# Patient Record
Sex: MALE | Race: WHITE | Employment: FULL TIME | URBAN - METROPOLITAN AREA
[De-identification: names, ages, dates, MRNs, and addresses within clinical notes are randomized per-mention and may not be internally consistent; named-entity substitution may affect disease eponyms.]

---

## 2017-12-11 ENCOUNTER — ANESTHESIA EVENT (OUTPATIENT)
Dept: SURGERY | Age: 42
End: 2017-12-11
Payer: COMMERCIAL

## 2017-12-12 ENCOUNTER — ANESTHESIA (OUTPATIENT)
Dept: SURGERY | Age: 42
End: 2017-12-12
Payer: COMMERCIAL

## 2017-12-12 ENCOUNTER — HOSPITAL ENCOUNTER (OUTPATIENT)
Age: 42
Setting detail: OUTPATIENT SURGERY
Discharge: HOME | End: 2017-12-12
Attending: SURGERY | Admitting: SURGERY
Payer: COMMERCIAL

## 2017-12-12 VITALS
RESPIRATION RATE: 20 BRPM | DIASTOLIC BLOOD PRESSURE: 82 MMHG | TEMPERATURE: 98.4 F | WEIGHT: 261.02 LBS | BODY MASS INDEX: 36.54 KG/M2 | OXYGEN SATURATION: 94 % | HEART RATE: 84 BPM | HEIGHT: 71 IN | SYSTOLIC BLOOD PRESSURE: 131 MMHG

## 2017-12-12 PROCEDURE — 77030033188 HC TBNG FLTRD BIIFUR DISP CNMD -C: Performed by: SURGERY

## 2017-12-12 PROCEDURE — 77030008771 HC TU NG SALEM SUMP -A: Performed by: ANESTHESIOLOGY

## 2017-12-12 PROCEDURE — 77030010507 HC ADH SKN DERMBND J&J -B: Performed by: SURGERY

## 2017-12-12 PROCEDURE — 77030002966 HC SUT PDS J&J -A: Performed by: SURGERY

## 2017-12-12 PROCEDURE — 77030026438 HC STYL ET INTUB CARD -A: Performed by: ANESTHESIOLOGY

## 2017-12-12 PROCEDURE — 77030011640 HC PAD GRND REM COVD -A: Performed by: SURGERY

## 2017-12-12 PROCEDURE — 77030010939 HC CLP LIG TELE -B: Performed by: SURGERY

## 2017-12-12 PROCEDURE — 77030018673: Performed by: SURGERY

## 2017-12-12 PROCEDURE — 77030035048 HC TRCR ENDOSC OPTCL COVD -B: Performed by: SURGERY

## 2017-12-12 PROCEDURE — 77030035045 HC TRCR ENDOSC VRSPRT BLDLSS COVD -B: Performed by: SURGERY

## 2017-12-12 PROCEDURE — 74011000250 HC RX REV CODE- 250: Performed by: SURGERY

## 2017-12-12 PROCEDURE — 77030002933 HC SUT MCRYL J&J -A: Performed by: SURGERY

## 2017-12-12 PROCEDURE — 76010000876 HC OR TIME 2 TO 2.5HR INTENSV - TIER 2: Performed by: SURGERY

## 2017-12-12 PROCEDURE — 76060000035 HC ANESTHESIA 2 TO 2.5 HR: Performed by: SURGERY

## 2017-12-12 PROCEDURE — 77030003666 HC NDL SPINAL BD -A: Performed by: SURGERY

## 2017-12-12 PROCEDURE — 77030031139 HC SUT VCRL2 J&J -A: Performed by: SURGERY

## 2017-12-12 PROCEDURE — 77030008756 HC TU IRR SUC STRY -B: Performed by: SURGERY

## 2017-12-12 PROCEDURE — 77030037892: Performed by: SURGERY

## 2017-12-12 PROCEDURE — 74011250636 HC RX REV CODE- 250/636

## 2017-12-12 PROCEDURE — 77030035277 HC OBTRTR BLDELSS DISP INTU -B: Performed by: SURGERY

## 2017-12-12 PROCEDURE — 74011250636 HC RX REV CODE- 250/636: Performed by: SURGERY

## 2017-12-12 PROCEDURE — 77030016151 HC PROTCTR LNS DFOG COVD -B: Performed by: SURGERY

## 2017-12-12 PROCEDURE — 77030020782 HC GWN BAIR PAWS FLX 3M -B

## 2017-12-12 PROCEDURE — 77030018836 HC SOL IRR NACL ICUM -A: Performed by: SURGERY

## 2017-12-12 PROCEDURE — 74011250637 HC RX REV CODE- 250/637: Performed by: SURGERY

## 2017-12-12 PROCEDURE — 77030036554: Performed by: SURGERY

## 2017-12-12 PROCEDURE — 77030019908 HC STETH ESOPH SIMS -A: Performed by: ANESTHESIOLOGY

## 2017-12-12 PROCEDURE — 76210000021 HC REC RM PH II 0.5 TO 1 HR: Performed by: SURGERY

## 2017-12-12 PROCEDURE — 77030003580 HC NDL INSUF VERES J&J -B: Performed by: SURGERY

## 2017-12-12 PROCEDURE — 77030020703 HC SEAL CANN DISP INTU -B: Performed by: SURGERY

## 2017-12-12 PROCEDURE — 74011250636 HC RX REV CODE- 250/636: Performed by: ANESTHESIOLOGY

## 2017-12-12 PROCEDURE — 88304 TISSUE EXAM BY PATHOLOGIST: CPT | Performed by: SURGERY

## 2017-12-12 PROCEDURE — 77030035489 HC REDUCR CANN ENDOWR INTU -C: Performed by: SURGERY

## 2017-12-12 PROCEDURE — 77030002895 HC DEV VASC CLOSR COVD -B: Performed by: SURGERY

## 2017-12-12 PROCEDURE — 77030035278 HC STPLR SEAL ENDOWR INTU -B: Performed by: SURGERY

## 2017-12-12 PROCEDURE — 77030035029 HC NDL INSUF VERES DISP COVD -B: Performed by: SURGERY

## 2017-12-12 PROCEDURE — 77030003028 HC SUT VCRL J&J -A: Performed by: SURGERY

## 2017-12-12 PROCEDURE — C1781 MESH (IMPLANTABLE): HCPCS | Performed by: SURGERY

## 2017-12-12 PROCEDURE — 74011000250 HC RX REV CODE- 250

## 2017-12-12 PROCEDURE — 76210000017 HC OR PH I REC 1.5 TO 2 HR: Performed by: SURGERY

## 2017-12-12 PROCEDURE — C9290 INJ, BUPIVACAINE LIPOSOME: HCPCS | Performed by: SURGERY

## 2017-12-12 PROCEDURE — 77030009852 HC PCH RTVR ENDOSC COVD -B: Performed by: SURGERY

## 2017-12-12 PROCEDURE — 77030027491 HC SHR ENDOSC COVD -B: Performed by: SURGERY

## 2017-12-12 PROCEDURE — 77030035236 HC SUT PDS STRATFX BARB J&J -B: Performed by: SURGERY

## 2017-12-12 PROCEDURE — 77030034849: Performed by: SURGERY

## 2017-12-12 PROCEDURE — 77030032490 HC SLV COMPR SCD KNE COVD -B: Performed by: SURGERY

## 2017-12-12 PROCEDURE — 77030013079 HC BLNKT BAIR HGGR 3M -A: Performed by: ANESTHESIOLOGY

## 2017-12-12 PROCEDURE — 77030008684 HC TU ET CUF COVD -B: Performed by: ANESTHESIOLOGY

## 2017-12-12 PROCEDURE — 77030020263 HC SOL INJ SOD CL0.9% LFCR 1000ML: Performed by: SURGERY

## 2017-12-12 PROCEDURE — 77030009848 HC PASSR SUT SET COOP -C: Performed by: SURGERY

## 2017-12-12 DEVICE — VENTRALIGHT ST MESH, 4" X 6" (10.2 CM X 15.2 CM), ELLIPSE
Type: IMPLANTABLE DEVICE | Site: ABDOMEN | Status: FUNCTIONAL
Brand: VENTRALIGHT

## 2017-12-12 RX ORDER — SUCCINYLCHOLINE CHLORIDE 20 MG/ML
INJECTION INTRAMUSCULAR; INTRAVENOUS AS NEEDED
Status: DISCONTINUED | OUTPATIENT
Start: 2017-12-12 | End: 2017-12-12 | Stop reason: HOSPADM

## 2017-12-12 RX ORDER — LIDOCAINE HYDROCHLORIDE 20 MG/ML
INJECTION, SOLUTION EPIDURAL; INFILTRATION; INTRACAUDAL; PERINEURAL AS NEEDED
Status: DISCONTINUED | OUTPATIENT
Start: 2017-12-12 | End: 2017-12-12 | Stop reason: HOSPADM

## 2017-12-12 RX ORDER — FEXOFENADINE HCL AND PSEUDOEPHEDRINE HCI 180; 240 MG/1; MG/1
1 TABLET, EXTENDED RELEASE ORAL DAILY
COMMUNITY

## 2017-12-12 RX ORDER — FLUMAZENIL 0.1 MG/ML
0.2 INJECTION INTRAVENOUS
Status: DISCONTINUED | OUTPATIENT
Start: 2017-12-12 | End: 2017-12-12 | Stop reason: HOSPADM

## 2017-12-12 RX ORDER — LABETALOL HYDROCHLORIDE 5 MG/ML
INJECTION, SOLUTION INTRAVENOUS AS NEEDED
Status: DISCONTINUED | OUTPATIENT
Start: 2017-12-12 | End: 2017-12-12 | Stop reason: HOSPADM

## 2017-12-12 RX ORDER — KETOROLAC TROMETHAMINE 30 MG/ML
INJECTION, SOLUTION INTRAMUSCULAR; INTRAVENOUS AS NEEDED
Status: DISCONTINUED | OUTPATIENT
Start: 2017-12-12 | End: 2017-12-12 | Stop reason: HOSPADM

## 2017-12-12 RX ORDER — HYDROCODONE BITARTRATE AND ACETAMINOPHEN 5; 325 MG/1; MG/1
1 TABLET ORAL
Qty: 15 TAB | Refills: 0 | Status: SHIPPED | OUTPATIENT
Start: 2017-12-12

## 2017-12-12 RX ORDER — MIDAZOLAM HYDROCHLORIDE 1 MG/ML
2 INJECTION, SOLUTION INTRAMUSCULAR; INTRAVENOUS
Status: DISCONTINUED | OUTPATIENT
Start: 2017-12-12 | End: 2017-12-12 | Stop reason: HOSPADM

## 2017-12-12 RX ORDER — FENTANYL CITRATE 50 UG/ML
INJECTION, SOLUTION INTRAMUSCULAR; INTRAVENOUS AS NEEDED
Status: DISCONTINUED | OUTPATIENT
Start: 2017-12-12 | End: 2017-12-12 | Stop reason: HOSPADM

## 2017-12-12 RX ORDER — PROPOFOL 10 MG/ML
INJECTION, EMULSION INTRAVENOUS AS NEEDED
Status: DISCONTINUED | OUTPATIENT
Start: 2017-12-12 | End: 2017-12-12 | Stop reason: HOSPADM

## 2017-12-12 RX ORDER — PANTOPRAZOLE SODIUM 20 MG/1
20 TABLET, DELAYED RELEASE ORAL DAILY
COMMUNITY

## 2017-12-12 RX ORDER — HYDROCODONE BITARTRATE AND ACETAMINOPHEN 5; 325 MG/1; MG/1
1 TABLET ORAL
Status: COMPLETED | OUTPATIENT
Start: 2017-12-12 | End: 2017-12-12

## 2017-12-12 RX ORDER — MIDAZOLAM HYDROCHLORIDE 1 MG/ML
INJECTION, SOLUTION INTRAMUSCULAR; INTRAVENOUS AS NEEDED
Status: DISCONTINUED | OUTPATIENT
Start: 2017-12-12 | End: 2017-12-12 | Stop reason: HOSPADM

## 2017-12-12 RX ORDER — NEOSTIGMINE METHYLSULFATE 1 MG/ML
INJECTION INTRAVENOUS AS NEEDED
Status: DISCONTINUED | OUTPATIENT
Start: 2017-12-12 | End: 2017-12-12 | Stop reason: HOSPADM

## 2017-12-12 RX ORDER — SODIUM CHLORIDE, SODIUM LACTATE, POTASSIUM CHLORIDE, CALCIUM CHLORIDE 600; 310; 30; 20 MG/100ML; MG/100ML; MG/100ML; MG/100ML
125 INJECTION, SOLUTION INTRAVENOUS CONTINUOUS
Status: DISCONTINUED | OUTPATIENT
Start: 2017-12-12 | End: 2017-12-12 | Stop reason: HOSPADM

## 2017-12-12 RX ORDER — CEFAZOLIN SODIUM IN 0.9 % NACL 2 G/50 ML
2 INTRAVENOUS SOLUTION, PIGGYBACK (ML) INTRAVENOUS
Status: COMPLETED | OUTPATIENT
Start: 2017-12-12 | End: 2017-12-12

## 2017-12-12 RX ORDER — DIPHENHYDRAMINE HYDROCHLORIDE 50 MG/ML
12.5 INJECTION, SOLUTION INTRAMUSCULAR; INTRAVENOUS AS NEEDED
Status: DISCONTINUED | OUTPATIENT
Start: 2017-12-12 | End: 2017-12-12 | Stop reason: HOSPADM

## 2017-12-12 RX ORDER — HYDROMORPHONE HYDROCHLORIDE 2 MG/ML
INJECTION, SOLUTION INTRAMUSCULAR; INTRAVENOUS; SUBCUTANEOUS AS NEEDED
Status: DISCONTINUED | OUTPATIENT
Start: 2017-12-12 | End: 2017-12-12 | Stop reason: HOSPADM

## 2017-12-12 RX ORDER — ROCURONIUM BROMIDE 10 MG/ML
INJECTION, SOLUTION INTRAVENOUS AS NEEDED
Status: DISCONTINUED | OUTPATIENT
Start: 2017-12-12 | End: 2017-12-12 | Stop reason: HOSPADM

## 2017-12-12 RX ORDER — GLYCOPYRROLATE 0.2 MG/ML
INJECTION INTRAMUSCULAR; INTRAVENOUS AS NEEDED
Status: DISCONTINUED | OUTPATIENT
Start: 2017-12-12 | End: 2017-12-12 | Stop reason: HOSPADM

## 2017-12-12 RX ORDER — ONDANSETRON 2 MG/ML
INJECTION INTRAMUSCULAR; INTRAVENOUS AS NEEDED
Status: DISCONTINUED | OUTPATIENT
Start: 2017-12-12 | End: 2017-12-12 | Stop reason: HOSPADM

## 2017-12-12 RX ORDER — NALOXONE HYDROCHLORIDE 0.4 MG/ML
0.2 INJECTION, SOLUTION INTRAMUSCULAR; INTRAVENOUS; SUBCUTANEOUS
Status: DISCONTINUED | OUTPATIENT
Start: 2017-12-12 | End: 2017-12-12 | Stop reason: HOSPADM

## 2017-12-12 RX ORDER — HYDROMORPHONE HYDROCHLORIDE 1 MG/ML
.25-1 INJECTION, SOLUTION INTRAMUSCULAR; INTRAVENOUS; SUBCUTANEOUS
Status: DISCONTINUED | OUTPATIENT
Start: 2017-12-12 | End: 2017-12-12 | Stop reason: HOSPADM

## 2017-12-12 RX ORDER — LIDOCAINE HYDROCHLORIDE 10 MG/ML
0.1 INJECTION, SOLUTION EPIDURAL; INFILTRATION; INTRACAUDAL; PERINEURAL AS NEEDED
Status: DISCONTINUED | OUTPATIENT
Start: 2017-12-12 | End: 2017-12-12 | Stop reason: HOSPADM

## 2017-12-12 RX ADMIN — MIDAZOLAM HYDROCHLORIDE 2 MG: 1 INJECTION, SOLUTION INTRAMUSCULAR; INTRAVENOUS at 10:40

## 2017-12-12 RX ADMIN — GLYCOPYRROLATE 0.5 MG: 0.2 INJECTION INTRAMUSCULAR; INTRAVENOUS at 12:18

## 2017-12-12 RX ADMIN — SODIUM CHLORIDE, SODIUM LACTATE, POTASSIUM CHLORIDE, AND CALCIUM CHLORIDE: 600; 310; 30; 20 INJECTION, SOLUTION INTRAVENOUS at 10:45

## 2017-12-12 RX ADMIN — SODIUM CHLORIDE, SODIUM LACTATE, POTASSIUM CHLORIDE, AND CALCIUM CHLORIDE 125 ML/HR: 600; 310; 30; 20 INJECTION, SOLUTION INTRAVENOUS at 08:19

## 2017-12-12 RX ADMIN — ROCURONIUM BROMIDE 5 MG: 10 INJECTION, SOLUTION INTRAVENOUS at 10:39

## 2017-12-12 RX ADMIN — PROPOFOL 200 MG: 10 INJECTION, EMULSION INTRAVENOUS at 10:40

## 2017-12-12 RX ADMIN — ONDANSETRON 4 MG: 2 INJECTION INTRAMUSCULAR; INTRAVENOUS at 12:14

## 2017-12-12 RX ADMIN — LIDOCAINE HYDROCHLORIDE 60 MG: 20 INJECTION, SOLUTION EPIDURAL; INFILTRATION; INTRACAUDAL; PERINEURAL at 10:40

## 2017-12-12 RX ADMIN — HYDROCODONE BITARTRATE AND ACETAMINOPHEN 1 TABLET: 5; 325 TABLET ORAL at 14:53

## 2017-12-12 RX ADMIN — HYDROMORPHONE HYDROCHLORIDE 1 MG: 2 INJECTION, SOLUTION INTRAMUSCULAR; INTRAVENOUS; SUBCUTANEOUS at 11:46

## 2017-12-12 RX ADMIN — HYDROMORPHONE HYDROCHLORIDE 0.5 MG: 1 INJECTION, SOLUTION INTRAMUSCULAR; INTRAVENOUS; SUBCUTANEOUS at 13:56

## 2017-12-12 RX ADMIN — LABETALOL HYDROCHLORIDE 15 MG: 5 INJECTION, SOLUTION INTRAVENOUS at 11:53

## 2017-12-12 RX ADMIN — FENTANYL CITRATE 100 MCG: 50 INJECTION, SOLUTION INTRAMUSCULAR; INTRAVENOUS at 11:01

## 2017-12-12 RX ADMIN — CEFAZOLIN 2 G: 1 INJECTION, POWDER, FOR SOLUTION INTRAMUSCULAR; INTRAVENOUS; PARENTERAL at 10:45

## 2017-12-12 RX ADMIN — SUCCINYLCHOLINE CHLORIDE 100 MG: 20 INJECTION INTRAMUSCULAR; INTRAVENOUS at 10:41

## 2017-12-12 RX ADMIN — MIDAZOLAM HYDROCHLORIDE 3 MG: 1 INJECTION, SOLUTION INTRAMUSCULAR; INTRAVENOUS at 10:36

## 2017-12-12 RX ADMIN — KETOROLAC TROMETHAMINE 30 MG: 30 INJECTION, SOLUTION INTRAMUSCULAR; INTRAVENOUS at 12:14

## 2017-12-12 RX ADMIN — NEOSTIGMINE METHYLSULFATE 3 MG: 1 INJECTION INTRAVENOUS at 12:18

## 2017-12-12 RX ADMIN — FENTANYL CITRATE 100 MCG: 50 INJECTION, SOLUTION INTRAMUSCULAR; INTRAVENOUS at 10:40

## 2017-12-12 RX ADMIN — FENTANYL CITRATE 50 MCG: 50 INJECTION, SOLUTION INTRAMUSCULAR; INTRAVENOUS at 10:36

## 2017-12-12 RX ADMIN — ROCURONIUM BROMIDE 30 MG: 10 INJECTION, SOLUTION INTRAVENOUS at 10:45

## 2017-12-12 NOTE — ANESTHESIA PREPROCEDURE EVALUATION
Anesthetic History   No history of anesthetic complications            Review of Systems / Medical History  Patient summary reviewed and nursing notes reviewed    Pulmonary        Sleep apnea: CPAP           Neuro/Psych   Within defined limits           Cardiovascular  Within defined limits                Exercise tolerance: >4 METS     GI/Hepatic/Renal     GERD: well controlled           Endo/Other        Obesity     Other Findings              Physical Exam    Airway  Mallampati: II    Neck ROM: normal range of motion   Mouth opening: Normal     Cardiovascular    Rhythm: regular  Rate: normal         Dental  No notable dental hx       Pulmonary  Breath sounds clear to auscultation               Abdominal         Other Findings            Anesthetic Plan    ASA: 2  Anesthesia type: general          Induction: Intravenous  Anesthetic plan and risks discussed with: Patient      Informed consent obtained.

## 2017-12-12 NOTE — IP AVS SNAPSHOT
[address] [place] [address] 
[phone] Patient: Regine Alex MRN: [id number] :1975 About your hospitalization You were admitted on:  2017 You last received care in the:  [facility] PACU You were discharged on:  2017 Why you were hospitalized Your primary diagnosis was:  Not on File Things You Need To Do (next 8 weeks) Follow up with Dimas Thomas MD  
  
Phone:  [phone] Where:  [address] [place] [address], [address] [place] [address] Discharge Orders None A check justyna indicates which time of day the medication should be taken. My Medications TAKE these medications as instructed Instructions Each Dose to Equal  
 Morning Noon Evening Bedtime ALLEGRA-D 24 HOUR 180-240 mg per tablet Generic drug:  fexofenadine-pseudoephedrine Your last dose was: Your next dose is: Take 1 Tab by mouth daily. 1 Tab HYDROcodone-acetaminophen 5-325 mg per tablet Commonly known as:  Andreas Ill Your last dose was: Your next dose is: Take 1 Tab by mouth every four (4) hours as needed for Pain. Max Daily Amount: 6 Tabs. 1 Tab  
    
   
   
   
  
 pantoprazole 20 mg tablet Commonly known as:  PROTONIX Your last dose was: Your next dose is: Take 20 mg by mouth daily. Indications: gastroesophageal reflux disease 20 mg Where to Get Your Medications Information on where to get these meds will be given to you by the nurse or doctor. ! Ask your nurse or doctor about these medications HYDROcodone-acetaminophen 5-325 mg per tablet Discharge Instructions POST-OPERATIVE INSTRUCTIONS 
OUTPATIENT SURGERY LAPAROSCOPIC HERNIA REPAIR Today you underwent hernia repair which is usually well tolerated However, below is a list of instructions which are important for you to follow. If you have any questions, please feel free to call your surgeons office. 1. EATING: Please eat lightly when you go home today for the first 24 hours. You may resume your regular diet after that. 2. EXERCISE: Limit your exercise for the first week, although stairs or other walking is fine. 3. DRESSING: Please keep the dressing dry for 48 hours after your surgery. After this time, the bandage may be removed and left undressed or covered with a lighter dressing. You may shower in 48 hours. Do not bathe in a tub or pool until you have seen your surgeon postoperatively. 4. NO LIFTING: Until you have seen your surgeon in his/her office following surgery, at which time you will receive further instructions. 5. DRIVING: No driving for 1-2 weeks but you may ride as a passenger anytime. Check with your surgeon for specific instructions. 6. PAIN: A prescription for pain has been given to you or your family. Please use it as prescribed and if this is inadequate, please call your surgeons office. In some cases, Tylenol or Advil may be adequate; and in that case, you will not need to fill the prescription. Pain medication may cause constipation - Colace or Milk of Magnesia may be used as needed. 7. WOUND: If you notice any increased redness, swelling, pain or fever, please call the office. If this is noticed at a time after normal office hours, please call our answering service at [phone]. The  will then contact your surgeon or the SURGICAL ASSOCIATE on call. 8. URINATION: If unable to void (unable to pass your water) please return to the hospital emergency department. 9. APPOINTMENT: Your surgeon would like to see you in his/her in 7 days. If this appointment has not been made for you prior to your departure from Outpatient Surgery, please call your surgeons office to schedule your appointment. 10. DISCOLORATION: Do not be alarmed by black or bluish discoloration of your anatomy. This may extent to the scrotum or labia. This will be due to blood under the skin that will absorb itself and resolve over several days with no ill-effects. If you have any questions or concerns, please do not hesitate to call your surgeon or any other staff member who will be able to massist you. EXCELLENCE IN GENERAL, VASCULAR, ONCOLOGIC, BREAST AND COLON & RECTAL SURGERY 
 
DISCHARGE SUMMARY from your Nurse The following personal items collected during your admission are returned to you:  
Dental Appliance: Dental Appliances: None Vision: Visual Aid: Glasses Hearing Aid:   
Jewelry: Jewelry: None Clothing: Clothing: Jacket/Coat, Pants, Shirt, Undergarments, Footwear Other Valuables: Other Valuables: None Valuables sent to safe:   
 
PATIENT INSTRUCTIONS: 
 
After general anesthesia or intravenous sedation, for 24 hours or while taking prescription Narcotics: · Limit your activities · Do not drive and operate hazardous machinery · Do not make important personal or business decisions · Do  not drink alcoholic beverages · If you have not urinated within 8 hours after discharge, please contact your surgeon on call. Report the following to your surgeon: 
· Excessive pain, swelling, redness or odor of or around the surgical area · Temperature over 100.5 · Nausea and vomiting lasting longer than 4 hours or if unable to take medications · Any signs of decreased circulation or nerve impairment to extremity: change in color, persistent  numbness, tingling, coldness or increase pain · Any questions [address] Breathing deep and coughing are very important exercises to do after surgery. Deep breathing and coughing open the little air tubes and air sacks in your lungs. You take deep breaths every day.   You may not even notice - it is just something you do when you sigh or yawn. It is a natural exercise you do to keep these air passages open. After surgery, take deep breaths and cough, on purpose. Coughing and deep breathing help prevent bronchitis and pneumonia after surgery. If you had chest or belly surgery, use a pillow as a \"hug anival\" and hold it tightly to your chest or belly when you cough. DIRECTIONS: 
6. Take 10 to 15 slow deep breaths every hour while awake. 7. Breathe in deeply, and hold it for 2 seconds. 8. Exhale slowly through puckered lips, like blowing up a balloon. 9. After every 4th or 5th deep breath, hug your pillow to your chest or belly and give a hard, deep cough. Yes, it will probably hurt. But doing this exercise is very important part of healing after surgery. Take your pain medicine to help you do this exercise without too much pain. IF YOU HAVE BEEN DIAGNOSED WITH SLEEP APNEA, PLEASE USE YOUR SLEEP APNEA DEVICE OR CPAP MACHINE WHEN YOU INTEND TO NAP AFTER TAKING PAIN MEDICATION. Ankle Pumps Ankle pumps increase the circulation of oxygenated blood to your lower extremities and decrease your risk for circulation problems such as blood clots. They also stretch the muscles, tendons and ligaments in your foot and ankle, and prevent joint contracture in the ankle and foot, especially after surgeries on the legs. It is important to do ankle pump exercises regularly after surgery because immobility increases your risk for developing a blood clot. Your doctor may also have you take an Aspirin for the next few days as well. If your doctor did not ask you to take an Aspirin, consult with him before starting Aspirin therapy on your own. Slowly point your foot forward, feeling the muscles on the top of your lower leg stretch, and hold this position for 5 seconds.   
 
          
 
Next, pull your foot back toward you as far as possible, stretching the calf muscles, and hold that position for 5 seconds. Repeat with the other foot. Perform 10 repetitions every hour while awake for both ankles if possible (down and then up with the foot once is one repetition). You should feel gentle stretching of the muscles in your lower leg when doing this exercise. If you feel pain, or your range of motion is limited, don't  Push too hard. Only go the limit your joint and muscles will let you go. If you have increasing pain, progressively worsening leg warmth or swelling, STOP the exercise and call your doctor. Below is information about the medications your doctor is prescribing after your visit: 
 
 
 
[email or web address] Introducing [facility]!    
 [employer] introduces [email or web address] patient portal. Now you can access parts of your medical record, email your doctor's office, and request medication refills online. 1. In your internet browser, go to [email or web address] 2. Click on the First Time User? Click Here link in the Sign In box. You will see the New Member Sign Up page. 3. Enter your [employer] Access Code exactly as it appears below. You will not need to use this code after youve completed the sign-up process. If you do not sign up before the expiration date, you must request a new code. · [employer] Access Code: [email or web address] Expires: 3/12/2018  7:10 AM 
 
4. Enter the last four digits of your Social Security Number (xxxx) and Date of Birth (mm/dd/yyyy) as indicated and click Submit. You will be taken to the next sign-up page. 5. Create a Z[employer] ID. This will be your [employer] login ID and cannot be changed, so think of one that is secure and easy to remember. 6. Create a [employer] password. You can change your password at any time. 7. Enter your Password Reset Question and Answer. This can be used at a later time if you forget your password. 8. Enter your e-mail address. You will receive e-mail notification when new information is available in [address] 9. Click Sign Up. You can now view and download portions of your medical record. 10. Click the Download Summary menu link to download a portable copy of your medical information. If you have questions, please visit the Frequently Asked Questions section of the [employer] website. Remember, [employer] is NOT to be used for urgent needs. For medical emergencies, dial 911. Now available from your iPhone and Android! Providers Seen During Your Hospitalization Provider Specialty Primary office phone Madelaine Pham MD General Surgery [phone] Your Primary Care Physician (PCP) Primary Care Physician Office Phone Office Fax Katarina Cox [phone] You are allergic to the following No active allergies Recent Documentation Height Weight BMI Smoking Status 1.803 m 118.4 kg 36.41 kg/m2 Former Smoker Emergency Contacts Name Discharge Info Relation Home Work Mobile Pete Ratliff CAREGIVER [3] Spouse [3]   [phone] Patient Belongings The following personal items are in your possession at time of discharge: 
  Dental Appliances: None  Visual Aid: Glasses      Home Medications: None   Jewelry: None  Clothing: Jacket/Coat, Pants, Shirt, Undergarments, Footwear    Other Valuables: None Please provide this summary of care documentation to your next provider. Signatures-by signing, you are acknowledging that this After Visit Summary has been reviewed with you and you have received a copy. Patient Signature:  ____________________________________________________________ Date:  ____________________________________________________________  
  
[facility] Provider Signature:  ____________________________________________________________ Date:  ____________________________________________________________

## 2017-12-12 NOTE — ANESTHESIA POSTPROCEDURE EVALUATION
Post-Anesthesia Evaluation and Assessment    Patient: Huang Beltran MRN: [id number]  SSN: [id number]    YOB: 1975  Age: 43 y.o. Sex: male       Cardiovascular Function/Vital Signs  Visit Vitals    /74    Pulse 79    Temp 36.9 °C (98.4 °F)    Resp 15    Ht 5' 11\" (1.803 m)    Wt 118.4 kg (261 lb 0.4 oz)    SpO2 95%    BMI 36.41 kg/m2       Patient is status post general anesthesia for Procedure(s):  ROBOTIC ASSISTED LAPAROSCOPIC CHOLECYSTECTOMY, ROBOTIC ASSISTED VENTRAL HERNIA REPAIR WITH MESH. Nausea/Vomiting: None    Postoperative hydration reviewed and adequate. Pain:  Pain Scale 1: Numeric (0 - 10) (12/12/17 1356)  Pain Intensity 1: 5 (12/12/17 1356)   Managed    Neurological Status:   Neuro (WDL): Exceptions to WDL (12/12/17 1250)  Neuro  Neurologic State: Sleeping;Unresponsive (12/12/17 1250)  Orientation Level: Unable to verbalize (12/12/17 1250)  Cognition: No command following (12/12/17 1250)   At baseline    Mental Status and Level of Consciousness: Alert and oriented     Pulmonary Status:   O2 Device: Oxygen mask (12/12/17 1250)   Adequate oxygenation and airway patent    Complications related to anesthesia: None    Post-anesthesia assessment completed.  No concerns    Signed By: Deb Jolley DO     December 12, 2017

## 2017-12-12 NOTE — OP NOTES
Patient: Darby Rivera MRN: [id number]  SSN: [id number]    YOB: 1975  Age: 43 y.o. Sex: male        OPERATIVE REPORT -    PREOPERATIVE DIAGNOSIS: BILIARY DYSKINESIA, VENTRAL HERNIA    POSTOPERATIVE DIAGNOSIS: BILIARY DYSKINESIA, VENTRAL HERNIA    OPERATIVE PROCEDURE:  Brock Clemens assisted laparoscopic cholecystectomy multiport  Robotic ventral hernia repair with mesh  Plication of rectus diastasis    SURGEON: Rodger Draper MD    ANESTHESIA: General    ANESTHESIOLOGIST: Dr Olesya Barajasis: None    ESTIMATED BLOOD LOSS: Minimal.    INDICATION: Documented in the history and physical.    FINDINGS: the GB showed evidence of mild inflammation with omental adhesions. Cystic duct was normal diameter. Ventral hernia in the midline 3 cm superior to the umbilicus - defect measured 2 cm across  He also had a defect at the umbilicus measuring 2 cm   Neither contained any omentum or bowel  He also had rectus diastasis    PROCEDURE:     Patient was evaluated in the preop holding area and updates to history and physical was completed. Patient was then brought to the operating room and general endotracheal anesthesia was induced. The abdomen was prepped in the standard manner. The laparoscopic camera and Co2 insufflation apparatus were fixed to the drapes in the usual manner. A time out was completed confirming patient, , procedure, site of surgery, special instruments and equipment needed for the procedure. Using Palmers point and a 5 mm Optiview trocar the peritoneal cavity was entered under vision. and after confirming its intra peritoneal position, it was connected to C02 insufflation. Pneumoperitoneum was created and maintained at 15 mmHg. The laparoscopic camera was introduced through this and laparoscopy was performed and it was confirmed that there was no intraabdominal injury during introduction of pneumoperitoneum and the trocar.     Under direct vision, two additional 8 mm ports were placed, one in the flank and another in the RLQ. A subumbilical 12 mm port was placed for the camera. Patient was positioned in reverse Trendelenburg with a tilt to the left. Once the appropriate position had been optimized, the ports were docked and surgeon proceeded to the console. The fundus was grasped and lifted up towards the diaphragm. The  infundibulum was retracted laterally and inferiorly after releasing the  adhesions. The junction of the cystic duct and gallbladder was clearly identified, and  an adequate length of the cystic duct was dissected out. Similarly, the cystic artery was also dissected out and an adequate length was displayed. Critical view of safety of  Calot's triangle was obtained and the structures were clearly identified. After this was satisfactorily done, the cystic duct was doubly clipped on both sides and divided. The cystic artery was also doubly clipped on both sides and divided. The gallbladder was then gently dissected off from the liver bed  using electrocautery and achieving hemostasis. as the dissection proceeded  upwards towards the fundus. The gallbladder was thus removed from its bed. An endo bag was then introduced through one of the ports and the GB was positioned within it. The GB was then retrieved through the umbilical port. Attention was then directed towards the hernia repair. Two 8 mm ports were positioned - one in the LLQ and another in the LUQ. These ports were docked and surgeon proceeded towards the console. The falciform ligament was dissected off from the abd wall to allow placement of the mesh. The fascia in the midline was sutured closed with a running suture of No 1 Stratifix - this suture was used to close both defects in the fascia and also approximate the rectus diastasis. A 15 m by 10 cm Ventra Lite mesh was introduced with a suture in the center.  This suture was retrieved through the anterior abd wall skin thereby holding the mesh against the abd wall. The mesh was then sutured circumferentially with 3/0 V Loc - good approximation of the mesh was obtained. The pneumoperitoneum was released and the ports were removed under vision. The incisions were closed with subcuticular 4-0 Monocryl with Dermabond to the skin. Exparel 20 ml with Marcaine 30 ml  was infiltrated into each  port site, prior to making the incisions. SPECIMEN: Gallbladder. COUNTS: Correct at the completion of surgery.     MD Shadi Christina MD

## 2017-12-12 NOTE — IP AVS SNAPSHOT
[address] 
[phone] Patient: Smita Menezes MRN: [id number] :1975 My Medications TAKE these medications as instructed Instructions Each Dose to Equal  
 Morning Noon Evening Bedtime ALLEGRA-D 24 HOUR 180-240 mg per tablet Generic drug:  fexofenadine-pseudoephedrine Your last dose was: Your next dose is: Take 1 Tab by mouth daily. 1 Tab HYDROcodone-acetaminophen 5-325 mg per tablet Commonly known as:  Thelbert [place] Your last dose was: Your next dose is: Take 1 Tab by mouth every four (4) hours as needed for Pain. Max Daily Amount: 6 Tabs. 1 Tab  
    
   
   
   
  
 pantoprazole 20 mg tablet Commonly known as:  PROTONIX Your last dose was: Your next dose is: Take 20 mg by mouth daily. Indications: gastroesophageal reflux disease 20 mg Where to Get Your Medications Information on where to get these meds will be given to you by the nurse or doctor. ! Ask your nurse or doctor about these medications HYDROcodone-acetaminophen 5-325 mg per tablet

## 2017-12-12 NOTE — DISCHARGE INSTRUCTIONS
POST-OPERATIVE INSTRUCTIONS  OUTPATIENT SURGERY LAPAROSCOPIC HERNIA REPAIR    Today you underwent hernia repair which is usually well tolerated However, below is a list of instructions which are important for you to follow. If you have any questions, please feel free to call your surgeons office. 1. EATING: Please eat lightly when you go home today for the first 24 hours. You may resume your regular diet after that. 2. EXERCISE: Limit your exercise for the first week, although stairs or other walking is fine. 3. DRESSING: Please keep the dressing dry for 48 hours after your surgery. After this time, the bandage may be removed and left undressed or covered with a lighter dressing. You may shower in 48 hours. Do not bathe in a tub or pool until you have seen your surgeon postoperatively. 4. NO LIFTING: Until you have seen your surgeon in his/her office following surgery, at which time you will receive further instructions. 5. DRIVING: No driving for 1-2 weeks but you may ride as a passenger anytime. Check with your surgeon for specific instructions. 6. PAIN: A prescription for pain has been given to you or your family. Please use it as prescribed and if this is inadequate, please call your surgeons office. In some cases, Tylenol or Advil may be adequate; and in that case, you will not need to fill the prescription. Pain medication may cause constipation - Colace or Milk of Magnesia may be used as needed. 7. WOUND: If you notice any increased redness, swelling, pain or fever, please call the office. If this is noticed at a time after normal office hours, please call our answering service at [phone]. The  will then contact your surgeon or the SURGICAL ASSOCIATE on call. 8. URINATION: If unable to void (unable to pass your water) please return to the hospital emergency department. 9. APPOINTMENT: Your surgeon would like to see you in his/her in 7 days.  If this appointment has not been made for you prior to your departure from Outpatient Surgery, please call your surgeons office to schedule your appointment. 10. DISCOLORATION: Do not be alarmed by black or bluish discoloration of your anatomy. This may extent to the scrotum or labia. This will be due to blood under the skin that will absorb itself and resolve over several days with no ill-effects. If you have any questions or concerns, please do not hesitate to call your surgeon or any other staff member who will be able to massist you. EXCELLENCE IN GENERAL, VASCULAR, ONCOLOGIC, BREAST AND COLON & RECTAL SURGERY      PATIENT INSTRUCTIONS:    After general anesthesia or intravenous sedation, for 24 hours or while taking prescription Narcotics:  · Limit your activities  · Do not drive and operate hazardous machinery  · Do not make important personal or business decisions  · Do  not drink alcoholic beverages  · If you have not urinated within 8 hours after discharge, please contact your surgeon on call. Report the following to your surgeon:  · Excessive pain, swelling, redness or odor of or around the surgical area  · Temperature over 100.5  · Nausea and vomiting lasting longer than 4 hours or if unable to take medications  · Any signs of decreased circulation or nerve impairment to extremity: change in color, persistent  numbness, tingling, coldness or increase pain  · Any questions    COUGH AND DEEP BREATHE    Breathing deep and coughing are very important exercises to do after surgery. Deep breathing and coughing open the little air tubes and air sacks in your lungs. You take deep breaths every day. You may not even notice - it is just something you do when you sigh or yawn. It is a natural exercise you do to keep these air passages open. After surgery, take deep breaths and cough, on purpose. Coughing and deep breathing help prevent bronchitis and pneumonia after surgery.   If you had chest or belly surgery, use a pillow as a \"hug anival\" and hold it tightly to your chest or belly when you cough. DIRECTIONS:  6. Take 10 to 15 slow deep breaths every hour while awake. 7. Breathe in deeply, and hold it for 2 seconds. 8. Exhale slowly through puckered lips, like blowing up a balloon. 9. After every 4th or 5th deep breath, hug your pillow to your chest or belly and give a hard, deep cough. Yes, it will probably hurt. But doing this exercise is very important part of healing after surgery. Take your pain medicine to help you do this exercise without too much pain. IF YOU HAVE BEEN DIAGNOSED WITH SLEEP APNEA, PLEASE USE YOUR SLEEP APNEA DEVICE OR CPAP MACHINE WHEN YOU INTEND TO NAP AFTER TAKING PAIN MEDICATION. Ankle Pumps    Ankle pumps increase the circulation of oxygenated blood to your lower extremities and decrease your risk for circulation problems such as blood clots. They also stretch the muscles, tendons and ligaments in your foot and ankle, and prevent joint contracture in the ankle and foot, especially after surgeries on the legs. It is important to do ankle pump exercises regularly after surgery because immobility increases your risk for developing a blood clot. Your doctor may also have you take an Aspirin for the next few days as well. If your doctor did not ask you to take an Aspirin, consult with him before starting Aspirin therapy on your own. Slowly point your foot forward, feeling the muscles on the top of your lower leg stretch, and hold this position for 5 seconds. Next, pull your foot back toward you as far as possible, stretching the calf muscles, and hold that position for 5 seconds. Repeat with the other foot. Perform 10 repetitions every hour while awake for both ankles if possible (down and then up with the foot once is one repetition).     You should feel gentle stretching of the muscles in your lower leg when doing this exercise. If you feel pain, or your range of motion is limited, don't  Push too hard. Only go the limit your joint and muscles will let you go. If you have increasing pain, progressively worsening leg warmth or swelling, STOP the exercise and call your doctor. Below is information about the medications your doctor is prescribing after your visit:    These are general instructions for a healthy lifestyle:    *  Please give a list of your current medications to your Primary Care Provider. *  Please update this list whenever your medications are discontinued, doses are      changed, or new medications (including over-the-counter products) are added. *  Please carry medication information at all times in case of emergency situations. About Smoking  No smoking / No tobacco products / Avoid exposure to second hand smoke    Surgeon General's Warning:  Quitting smoking now greatly reduces serious risk to your health. Obesity, smoking, and sedentary lifestyle greatly increases your risk for illness and disease. A healthy diet, regular physical exercise & weight monitoring are important for maintaining a healthy lifestyle. Congestive Heart Failure  You may be retaining fluid if you have a history of heart failure or if you experience any of the following symptoms:  Weight gain of 3 pounds or more overnight or 5 pounds in a week, increased swelling in our hands or feet or shortness of breath while lying flat in bed. Please call your doctor as soon as you notice any of these symptoms; do not wait until your next office visit. Recognize signs and symptoms of STROKE:  F - face looks uneven  A - arms unable to move or move even  S - speech slurred or non-existent  T - time-call 911 as soon as signs and symptoms begin-DO NOT go         Back to bed or wait to see if you get better-TIME IS BRAIN. Warning signs of HEART ATTACK  Call 911 if you have these symptoms    · Chest discomfort.   Most heart attacks involve discomfort in the center of the chest that lasts more than a few minutes, or that goes away and comes back. It can feel like uncomfortable pressure, squeezing, fullness, or pain. · Discomfort in other areas of the upper body. Symptoms can include pain or discomfort in one or both        Arms, the back, neck, jaw, or stomach. ·  Shortness of breath with or without chest discomfort. · Other signs may include breaking out in a cold sweat, nausea, or lightheadedness    Don't wait more than five minutes to call 911 - MINUTES MATTER! Fast action can save your life. Calling 911 is almost always the fastest way to get lifesaving treatment. Emergency Medical Services staff can begin treatment when they arrive - up to an hour sooner than if someone gets to the hospital by car. [facility] MEDICATION AND SIDE EFFECT GUIDE    The [employer] MEDICATION AND SIDE EFFECT GUIDE was provided to the PATIENT AND CARE PROVIDER. Information provided includes instruction about drug purpose and common side effects for the following medications:    · Norco      [email or web address]

## 2017-12-12 NOTE — BRIEF OP NOTE
BRIEF OPERATIVE NOTE    Date of Procedure: 12/12/2017   Preoperative Diagnosis: BILIARY DYSKINESIA, VENTRAL HERNIA  Postoperative Diagnosis: BILIARY DYSKINESIA, VENTRAL HERNIA    Procedure(s):  ROBOTIC ASSISTED LAPAROSCOPIC CHOLECYSTECTOMY, ROBOTIC ASSISTED VENTRAL HERNIA REPAIR WITH MESH AND PLICATION OF RECTUS DIASTASIS  Surgeon(s) and Role: * Herman Schaeffer MD - Primary         Assistant Staff:       Surgical Staff:  Circ-1: Sofia Henry RN  Circ-2: Xiao Rojas  Circ-Relief: Ryan Iniguez RN  Scrub Tech-1: Carrie Macias  Scrub RN-Relief: Oscar Sale  Surg Asst-1: Robert Me  Surg Asst-Relief: Sharon Medellin RN  Event Time In   Incision Start 1101   Incision Close      Anesthesia: General   Estimated Blood Loss: MINIMAL  Specimens:   ID Type Source Tests Collected by Time Destination   1 : GALLBLADDER Preservative Abdomen  Herman Schaeffer MD 12/12/2017 1152 Pathology      Findings: cholecystitis, ventral hernia x 2 , rectus diastasis   Complications: none  Implants:   Implant Name Type Inv.  Item Serial No.  Lot No. LRB No. Used Action   MESH LIZZETTE ELLIPTICAL 4X6IN -- VENTRALIGHT S/T - SNA   MESH LIZZETTE ELLIPTICAL 4X6IN -- VENTRALIGHT S/T NA BARD DAVOL [id number] N/A 1 Implanted

## (undated) DEVICE — SURGICAL PROCEDURE KIT GEN LAPAROSCOPY LF

## (undated) DEVICE — CARTRIDGE CLP LIG HEMLOK GRN --

## (undated) DEVICE — SUTURE PDS II SZ 0 L27IN ABSRB VLT L26MM CT-2 1/2 CIR Z334H

## (undated) DEVICE — NEEDLE INSUF L120MM DIA2MM DISP FOR PNEUMOPERI ENDOPATH

## (undated) DEVICE — Device

## (undated) DEVICE — SUTURE VCRL SZ 0 L18IN ABSRB UD POLYGLACTIN 910 BRAID TIE J912G

## (undated) DEVICE — SUTURE VCRL SZ 0 L27IN ABSRB VLT L36MM CT-1 1/2 CIR J340H

## (undated) DEVICE — PAD 05IN BASE 3IN PEAK M DENS CONVOLUTED FOAM

## (undated) DEVICE — BLADELESS OPTICAL TROCAR WITH FIXATION CANNULA: Brand: VERSAPORT

## (undated) DEVICE — AIRSEAL BIFURCATED SMOKE EVAC FILTERED TUBE SET: Brand: AIRSEAL

## (undated) DEVICE — NEEDLE HYPO 22GA L1.5IN BLK S STL HUB POLYPR SHLD REG BVL

## (undated) DEVICE — SOL IRRIGATION INJ NACL 0.9% 500ML BTL

## (undated) DEVICE — KENDALL SCD EXPRESS SLEEVES, KNEE LENGTH, MEDIUM: Brand: KENDALL SCD

## (undated) DEVICE — VISUALIZATION SYSTEM: Brand: CLEARIFY

## (undated) DEVICE — TOWEL SURG W17XL27IN STD BLU COT NONFENESTRATED PREWASHED

## (undated) DEVICE — INSUFFLATION NEEDLE: Brand: SURGINEEDLE

## (undated) DEVICE — SUTURE PDS II SZ 0 L27IN ABSRB VLT L36MM CT-1 1/2 CIR Z340H

## (undated) DEVICE — DRAPE,REIN 53X77,STERILE: Brand: MEDLINE

## (undated) DEVICE — BLADELESS OBTURATOR

## (undated) DEVICE — ELECTRO LUBE IS A SINGLE PATIENT USE DEVICE THAT IS INTENDED TO BE USED ON ELECTROSURGICAL ELECTRODES TO REDUCE STICKING.: Brand: KEY SURGICAL ELECTRO LUBE

## (undated) DEVICE — SHEARS: Brand: ENDO SHEARS

## (undated) DEVICE — REDUCER CANN ENDOWRIST 12-8MM -- DA VINCI XI - SNGL USE

## (undated) DEVICE — SUTURE SZ 0 27IN 5/8 CIR UR-6  TAPER PT VIOLET ABSRB VICRYL J603H

## (undated) DEVICE — REM POLYHESIVE ADULT PATIENT RETURN ELECTRODE: Brand: VALLEYLAB

## (undated) DEVICE — COVER,MAYO STAND,STERILE: Brand: MEDLINE

## (undated) DEVICE — NDL SPNE QNCKE 18GX3.5IN LF --

## (undated) DEVICE — 3M™ IOBAN™ 2 ANTIMICROBIAL INCISE DRAPE 6650EZ: Brand: IOBAN™ 2

## (undated) DEVICE — DERMABOND SKIN ADH 0.7ML -- DERMABOND ADVANCED 12/BX

## (undated) DEVICE — (D)PREP SKN CHLRAPRP APPL 26ML -- CONVERT TO ITEM 371833

## (undated) DEVICE — BLADELESS OPTICAL TROCAR WITH FIXATION CANNULA: Brand: VERSAONE

## (undated) DEVICE — BAG SPEC RETRV 275ML 10ML DISPOSABLE RELIACATCH

## (undated) DEVICE — SEAL

## (undated) DEVICE — TRAY CATH 16F DRN BG LTX -- CONVERT TO ITEM 363158

## (undated) DEVICE — OBTRTR BLDELSS 8MM DISP -- DA VINCI - SNGL USE

## (undated) DEVICE — DEVICE TRNSF SPIK STL 2008S] MICROTEK MEDICAL INC]

## (undated) DEVICE — TIP COVER ACCESSORY

## (undated) DEVICE — COVER,TABLE,60X90,STERILE: Brand: MEDLINE

## (undated) DEVICE — BINDER ABD M/L H12IN FOR 46-62IN WHT 4 SLD PNL DSGN HOOP

## (undated) DEVICE — 3000CC GUARDIAN II: Brand: GUARDIAN

## (undated) DEVICE — COVER LT HNDL BLU PLAS

## (undated) DEVICE — SUTURE STRATAFIX SYMMETRIC SZ 1 L18IN ABSRB VLT CT1 L36CM SXPP1A404

## (undated) DEVICE — TROCAR SITE CLOSURE DEVICE: Brand: ENDO CLOSE

## (undated) DEVICE — INSTRMT SET WND CLSR SUT PASS --

## (undated) DEVICE — STERILE POLYISOPRENE POWDER-FREE SURGICAL GLOVES: Brand: PROTEXIS

## (undated) DEVICE — ARM DRAPE

## (undated) DEVICE — SUTURE MCRYL SZ 4-0 L27IN ABSRB UD L19MM PS-2 1/2 CIR PRIM Y426H

## (undated) DEVICE — DEVON™ KNEE AND BODY STRAP 60" X 3" (1.5 M X 7.6 CM): Brand: DEVON

## (undated) DEVICE — SEAL UNIV 5-8MM DISP BX/10 -- DA VINCI XI - SNGL USE

## (undated) DEVICE — INFECTION CONTROL KIT SYS

## (undated) DEVICE — SPECIMEN RETRIEVAL POUCH: Brand: ENDO CATCH GOLD